# Patient Record
Sex: FEMALE | Race: WHITE
[De-identification: names, ages, dates, MRNs, and addresses within clinical notes are randomized per-mention and may not be internally consistent; named-entity substitution may affect disease eponyms.]

---

## 2017-02-10 ENCOUNTER — HOSPITAL ENCOUNTER (OUTPATIENT)
Dept: HOSPITAL 61 - PCVCCLINIC | Age: 72
Discharge: HOME | End: 2017-02-10
Attending: INTERNAL MEDICINE
Payer: MEDICARE

## 2017-02-10 DIAGNOSIS — R94.31: Primary | ICD-10-CM

## 2017-02-10 DIAGNOSIS — E78.5: ICD-10-CM

## 2017-02-10 DIAGNOSIS — I10: ICD-10-CM

## 2017-02-10 DIAGNOSIS — J44.9: ICD-10-CM

## 2017-02-10 DIAGNOSIS — F17.200: ICD-10-CM

## 2017-02-10 DIAGNOSIS — Z82.49: ICD-10-CM

## 2017-02-10 PROCEDURE — G0463 HOSPITAL OUTPT CLINIC VISIT: HCPCS

## 2017-02-10 PROCEDURE — 80061 LIPID PANEL: CPT

## 2017-02-10 PROCEDURE — 93005 ELECTROCARDIOGRAM TRACING: CPT

## 2017-08-24 ENCOUNTER — HOSPITAL ENCOUNTER (OUTPATIENT)
Dept: HOSPITAL 61 - PCVCCLINIC | Age: 72
Discharge: HOME | End: 2017-08-24
Attending: INTERNAL MEDICINE
Payer: MEDICARE

## 2017-08-24 DIAGNOSIS — J43.2: ICD-10-CM

## 2017-08-24 DIAGNOSIS — E78.5: ICD-10-CM

## 2017-08-24 DIAGNOSIS — Z87.891: ICD-10-CM

## 2017-08-24 DIAGNOSIS — E03.9: ICD-10-CM

## 2017-08-24 DIAGNOSIS — Z82.49: ICD-10-CM

## 2017-08-24 DIAGNOSIS — Z88.8: ICD-10-CM

## 2017-08-24 DIAGNOSIS — R94.31: ICD-10-CM

## 2017-08-24 DIAGNOSIS — I10: Primary | ICD-10-CM

## 2017-08-24 PROCEDURE — 80061 LIPID PANEL: CPT

## 2017-08-24 PROCEDURE — G0463 HOSPITAL OUTPT CLINIC VISIT: HCPCS

## 2017-09-05 ENCOUNTER — HOSPITAL ENCOUNTER (OUTPATIENT)
Dept: HOSPITAL 61 - PCVCIMAG | Age: 72
Discharge: HOME | End: 2017-09-05
Attending: INTERNAL MEDICINE
Payer: MEDICARE

## 2017-09-05 DIAGNOSIS — J43.9: ICD-10-CM

## 2017-09-05 DIAGNOSIS — Z87.891: ICD-10-CM

## 2017-09-05 DIAGNOSIS — R94.31: ICD-10-CM

## 2017-09-05 DIAGNOSIS — I10: ICD-10-CM

## 2017-09-05 DIAGNOSIS — I08.3: Primary | ICD-10-CM

## 2017-09-05 DIAGNOSIS — K52.9: ICD-10-CM

## 2017-09-05 DIAGNOSIS — E78.5: ICD-10-CM

## 2017-09-05 PROCEDURE — A9500 TC99M SESTAMIBI: HCPCS

## 2017-09-05 PROCEDURE — 93306 TTE W/DOPPLER COMPLETE: CPT

## 2017-09-05 PROCEDURE — 78452 HT MUSCLE IMAGE SPECT MULT: CPT

## 2017-09-05 PROCEDURE — 93017 CV STRESS TEST TRACING ONLY: CPT

## 2017-09-05 NOTE — PCVCIMAG
--------------- APPROVED REPORT --------------





Exam: Nuclear Stress Test

Indication: Chest Pain, Abn EKG

Patient Location: Out Patient

Stress Nurse: Sayda Higgins RN, Johnna Gar RN

NM Tech:Willy Zimmer NMTCB



Ht: 5 ft 2 in Wt: 171 lbs BSA:  1.79 m2

HR: 59 bpm                      BP: 178/74 mmHg         BMI:  

31.2

Rhythm:  NSR



Medical History

Medical History: Age, HTN, Former Smoker, Abn EKG

Medications: Atenolol (held 24 hours) Buspar, Fish Oil

Allergies: Diltiazem, Nortriptylline, Butalbitol

Pretest Chest Pain Characteristics: No chest pain

Exercise History: Sedentary



NM EXAM: Myocardial Perfusion REST/STRESS

Imaging Protocol: Rest Tc-99m/Stress Tc-99m 1 day



Resting Data

Rest SPECT myocardial perfusion imaging was performed in supine 

position 45 minutes following the intravenous injection of 11.6 mCi 

of Tc-99m Sestamibi.

Time of rest injection: 0915     Date: 09/05/2017



Pharmacologic Stress

Pharmacologic stress test was performed by injecting Regadenoson 0.4 

mg IV push followed by the intravenous injection of 35.5 mCi of 

Tc-99m Sestamibi.

Time of stress injection: 1040     Date: 09/05/2017

The images were gated to evaluate regional wall motion and calculate 

left ventricular ejection fraction. 



Study Quality

Study: Good



Study Data

Post stress, the left ventricular ejection was 75%..

SSS: 2

SRS: 1

SDS: 1

TID = 0.96.



Perfusion

No evidence of stress induced ischemia or prior myocardial 

infarction.



Wall Motion

Normal left ventricular size and function with no regional wall 

motion abnormalities.



Nuclear Conclusion

No evidence of stress induced ischemia or prior myocardial 

infarction.

Normal left ventricular size and function with no regional wall 

motion abnormalities.

Post stress, the left ventricular ejection was 75%.. 

No prior study available for comparison.



Interpreted by:  Reji Catalan MD

Electronically Approved: 09/05/2017 12:08:35



Stress Test Details

Stress Test:  Pharmacologic stress was paired with low level 

exercise.

HR

Resting HR:            59 bpmMax Heart Rate (APMHR): 149 bpm 

Max HR Achieved:  94 bpmTarget HR (85% APMHR): 126 bpm

% of APMHR:         63

Recovery HR:            68 bpm



BP

Resting BP:  178/74 mmHg

Max BP:       152/74 mmHg



ECG

Resting ECG:  Sinus Rhythm  with nonspecific ST and T wave 

changes

Stress ECG:     Sinus Rhythm

Maximum ST Deviation: 0 mm

Arrhythmia:    VPC's

Recovery ECG: Sinus Rhythm

Recovery ST Change: , Normal

Recovery ST Deviation: 0 mm

Recovery Arrhythmia: VPC



Clinical

Reason for Termination: Completed protocol

Stress Symptoms: Dyspnea

Exercise duration: 4 min 0 sec

Exercise capacity: 1.6 METs

Symptoms resolved during recovery.



Stress ECG Conclusion

ECG: Non-ischemic

Clinical: Non-ischemic



&lt;Conclusion&gt;

ECG: Non-ischemic

Clinical: Non-ischemic

## 2017-09-05 NOTE — PCVCIMAG
--------------- APPROVED REPORT --------------





Study performed:  2017 07:59:59



EXAM: Comprehensive 2D, Doppler, and color-flow 

Echocardiogram

Patient Location: Echo lab

Status:  routine



BSA:         1.81

HR: 54 bpmBP:          122/72 mmHg

Rhythm: Bradycardia



Other Information 

Study Quality: Good



Risk Factors: 

Cardiac Risk Factors:  HTN



Indications

Abnormal ECG 

Chest Pain



2D Dimensions

LVEF(%):  56.03 (&gt;50%)

IVSd:  11.35 (7-11mm)

LVDd:  41.72 mm

PWd:  11.30 (7-11mm)

LVDs:  29.65 (25-40mm)

Left Atrium:  41.70 (27-40mm)

Aortic Root:  32.14 mm

LV Single Plane 4CH:  68.05 %

LV Single Plane 2CH:  69.15 %Infante's LVEF:  68.60 %

Biplane EF:  69.8 %



Volumes

Left Atrial Volume (Systole)

Single Plane 4CH:  82.57 mLSingle Plane 2CH:  70.23 mL

LA ESV Index:  42.00 mL/m2



Aortic Valve

AoV Peak Mayank.:  1.46 m/s

AO Peak Gr.:  8.53 mmHgLVOT Max P.82 mmHg

LVOT Max V:  1.21 m/s

AI Vmax:  5.40 m/s

AI Randolph:  2.36 m/s2

AI PHT:  662.50 ms



Mitral Valve

E/A Ratio:  1.4

MV Decel. Time:  223.01 ms

MV E Max Mayank.:  0.63 m/s

MV A Mayank.:  0.46 m/s

IVRT:  152.25 ms



Pulmonary Valve

PV Peak Mayank.:  1.04 m/sPV Peak Gr.:  4.29 mmHg



Pulmonary Vein

P Vein S:    0.35 m/sP Vein A:  0.28 m/s

P Vein D:   0.58 m/sP Vein A Dur.:  145.3 msec

P Vein S/D Ratio:  0.60



Tricuspid Valve

TR Peak Mayank.:  2.38 m/s

TR Peak Gr.:  22.57 mmHg



Left Ventricle

The left ventricle is normal size. There is normal LV segmental wall 

motion. There is normal left ventricular wall thickness. Left 

ventricular systolic function is normal. The left ventricular 

ejection fraction is within the normal range. LVEF is 65%. Grade II - 

pseudonormal filling dynamics.



Right Ventricle

The right ventricle is normal size. The right ventricular systolic 

function is normal.



Atria

Left atrium is mildly dilated. The right atrium size is 

normal.



Aortic Valve

The aortic valve is trileaflet, mildly sclerotic Mild aortic 

regurgitation. There is no aortic valvular stenosis.



Mitral Valve

The mitral valve is normal in structure. Mild mitral regurgitation. 

No evidence of mitral valve stenosis.



Tricuspid Valve

The tricuspid valve is normal in structure. Trace tricuspid 

regurgitation with PAP of 30 mmHg.



Pulmonic Valve

The pulmonary valve is normal in structure. Trace pulmonic 

regurgitation.



Great Vessels

The aortic root is normal in size. IVC is normal in size and 

collapses with &gt;50% inspiration



Pericardium

There is no pericardial effusion.



&lt;Conclusion&gt;

Left ventricular systolic function is normal.

There is normal LV segmental wall motion.

LVEF is 65%. Grade II - pseudonormal filling dynamics.

Left atrium is mildly dilated.

The aortic valve is trileaflet, mildly sclerotic.  Mild aortic 

regurgitation, no stenosis.

The mitral valve is normal in structure. Mild mitral 

regurgitation.

There is no pericardial effusion.

## 2018-02-26 ENCOUNTER — HOSPITAL ENCOUNTER (OUTPATIENT)
Dept: HOSPITAL 61 - PCVCCLINIC | Age: 73
Discharge: HOME | End: 2018-02-26
Attending: INTERNAL MEDICINE
Payer: MEDICARE

## 2018-02-26 DIAGNOSIS — I10: Primary | ICD-10-CM

## 2018-02-26 DIAGNOSIS — Z87.891: ICD-10-CM

## 2018-02-26 DIAGNOSIS — E78.5: ICD-10-CM

## 2018-02-26 DIAGNOSIS — R94.31: ICD-10-CM

## 2018-02-26 DIAGNOSIS — Z79.899: ICD-10-CM

## 2018-02-26 DIAGNOSIS — J43.2: ICD-10-CM

## 2018-02-26 PROCEDURE — 93005 ELECTROCARDIOGRAM TRACING: CPT

## 2018-02-26 PROCEDURE — 80061 LIPID PANEL: CPT

## 2018-10-17 ENCOUNTER — HOSPITAL ENCOUNTER (OUTPATIENT)
Dept: HOSPITAL 61 - PCVCCLINIC | Age: 73
Discharge: HOME | End: 2018-10-17
Attending: INTERNAL MEDICINE
Payer: MEDICARE

## 2018-10-17 DIAGNOSIS — Z88.8: ICD-10-CM

## 2018-10-17 DIAGNOSIS — R94.31: ICD-10-CM

## 2018-10-17 DIAGNOSIS — Z79.899: ICD-10-CM

## 2018-10-17 DIAGNOSIS — Z79.82: ICD-10-CM

## 2018-10-17 DIAGNOSIS — E78.5: ICD-10-CM

## 2018-10-17 DIAGNOSIS — I11.0: Primary | ICD-10-CM

## 2018-10-17 DIAGNOSIS — R07.9: ICD-10-CM

## 2018-10-17 DIAGNOSIS — J43.2: ICD-10-CM

## 2018-10-17 DIAGNOSIS — Z87.891: ICD-10-CM

## 2018-10-17 DIAGNOSIS — I50.32: ICD-10-CM

## 2018-10-17 PROCEDURE — 80061 LIPID PANEL: CPT

## 2018-10-17 PROCEDURE — 93005 ELECTROCARDIOGRAM TRACING: CPT

## 2018-10-17 PROCEDURE — G0463 HOSPITAL OUTPT CLINIC VISIT: HCPCS

## 2019-04-18 ENCOUNTER — HOSPITAL ENCOUNTER (OUTPATIENT)
Dept: HOSPITAL 61 - PCVCCLINIC | Age: 74
Discharge: HOME | End: 2019-04-18
Attending: INTERNAL MEDICINE
Payer: MEDICARE

## 2019-04-18 DIAGNOSIS — R94.31: Primary | ICD-10-CM

## 2019-04-18 DIAGNOSIS — I11.0: ICD-10-CM

## 2019-04-18 DIAGNOSIS — Z87.891: ICD-10-CM

## 2019-04-18 DIAGNOSIS — Z88.8: ICD-10-CM

## 2019-04-18 DIAGNOSIS — I50.32: ICD-10-CM

## 2019-04-18 DIAGNOSIS — J43.2: ICD-10-CM

## 2019-04-18 DIAGNOSIS — E78.5: ICD-10-CM

## 2019-04-18 DIAGNOSIS — Z79.82: ICD-10-CM

## 2019-04-18 PROCEDURE — 80061 LIPID PANEL: CPT

## 2019-04-18 PROCEDURE — 36415 COLL VENOUS BLD VENIPUNCTURE: CPT

## 2019-04-18 PROCEDURE — G0463 HOSPITAL OUTPT CLINIC VISIT: HCPCS

## 2019-04-18 PROCEDURE — 93005 ELECTROCARDIOGRAM TRACING: CPT

## 2019-10-24 ENCOUNTER — HOSPITAL ENCOUNTER (OUTPATIENT)
Dept: HOSPITAL 61 - PCVCIMAG | Age: 74
Discharge: HOME | End: 2019-10-24
Attending: INTERNAL MEDICINE
Payer: MEDICARE

## 2019-10-24 DIAGNOSIS — R94.31: ICD-10-CM

## 2019-10-24 DIAGNOSIS — J43.2: Primary | ICD-10-CM

## 2019-10-24 DIAGNOSIS — E78.00: ICD-10-CM

## 2019-10-24 DIAGNOSIS — I11.0: ICD-10-CM

## 2019-10-24 DIAGNOSIS — Z87.891: ICD-10-CM

## 2019-10-24 DIAGNOSIS — Z79.82: ICD-10-CM

## 2019-10-24 DIAGNOSIS — I50.32: ICD-10-CM

## 2019-10-24 DIAGNOSIS — Z79.899: ICD-10-CM

## 2019-10-24 DIAGNOSIS — E78.5: ICD-10-CM

## 2019-10-24 DIAGNOSIS — I35.8: ICD-10-CM

## 2019-10-24 DIAGNOSIS — E03.9: ICD-10-CM

## 2019-10-24 PROCEDURE — 93306 TTE W/DOPPLER COMPLETE: CPT

## 2019-10-24 PROCEDURE — 36415 COLL VENOUS BLD VENIPUNCTURE: CPT

## 2019-10-24 PROCEDURE — G0463 HOSPITAL OUTPT CLINIC VISIT: HCPCS

## 2019-10-24 PROCEDURE — 93005 ELECTROCARDIOGRAM TRACING: CPT

## 2019-10-24 PROCEDURE — 80061 LIPID PANEL: CPT

## 2019-10-24 NOTE — PCVCIMAG
--------------- APPROVED REPORT --------------





Study performed:  10/24/2019 14:00:50



EXAM: Comprehensive 2D, Doppler, and color-flow 

Echocardiogram

Patient Location: Echo lab

Status:  routine



BSA:         1.78

HR: 52 bpmBP:          130/80 mmHg

Rhythm: NSR



Other Information 

Study Quality: Good



Indications

Abnormal ECG 

Abnormal EKG, Emphysema



2D Dimensions

IVSd:  13.28 (7-11mm)LVOT Diam:  17.56 (18-24mm) 

LVDd:  35.64 mm

PWd:  11.83 (7-11mm)Ascending Ao:  34.09 (22-36mm)

LVDs:  24.75 (25-40mm)

Left Atrium:  47.01 (27-40mm)

LV Single Plane 4CH:  56.63 %

LV Single Plane 2CH:  65.04 %

Biplane EF:  61.2 %



Volumes

Left Atrial Volume (Systole)

Single Plane 4CH:  43.28 mLSingle Plane 2CH:  38.94 mL

LA ESV Index:  24.00 mL/m2



Aortic Valve

AoV Peak Mayank.:  1.47 m/s

AO Peak Gr.:  8.63 mmHgLVOT Max P.46 mmHg

LVOT Max V:  1.17 m/s

FRANK Vmax: 1.93 cm2



Mitral Valve

E/A Ratio:  0.7

MV Decel. Time:  257.77 ms

MV E Max Mayank.:  0.51 m/s

MV A Mayank.:  0.77 m/s



TDI

E/Lateral E':  7.29E/Medial E':  8.50

Medial E' Mayank.:  0.06 m/s

Lateral E' Mayank.:  0.07 m/s



Pulmonary Valve

PV Peak Gr.:  4.41 mmHg



Pulmonary Vein

P Vein S:    0.38 m/sP Vein A:  0.50 m/s

P Vein D:   0.31 m/sP Vein A Dur.:  128.0 msec

P Vein S/D Ratio:  1.23



Left Ventricle

The left ventricle is normal size. There is normal LV segmental wall 

motion. There is normal left ventricular wall thickness. Left 

ventricular systolic function is normal. The left ventricular 

ejection fraction is within the normal range. LVEF is 60-65%. Mild 

diastolic dysfunction is present (impaired relaxation 

pattern).



Right Ventricle

The right ventricle is normal size. The right ventricular systolic 

function is normal.



Atria

The left atrium size is normal. The right atrium size is 

normal.



Aortic Valve

The aortic valve is minimally sclerotic Trace aortic regurgitation. 

There is no aortic valvular stenosis.



Mitral Valve

The mitral valve is normal in structure. Trace mitral regurgitation. 

No evidence of mitral valve stenosis.



Tricuspid Valve

The tricuspid valve is normal in structure. There is no tricuspid 

valve regurgitation noted.



Pulmonic Valve

The pulmonary valve is normal in structure. There is no pulmonic 

valvular regurgitation.



Great Vessels

The aortic root is normal in size. IVC is normal in size and 

collapses >50% with inspiration.



Pericardium

There is no pericardial effusion.



<Conclusion>

Left ventricular systolic function is normal.

There is normal LV segmental wall motion.

LVEF is 60-65%. Mild diastolic dysfunction

The aortic valve is minimally sclerotic. Trace aortic 

regurgitation.

The mitral valve is normal in structure. Trace mitral 

regurgitation.

Pulmonary artery systolic pressure could not be reliably 

ascertained

There is no pericardial effusion.

## 2020-05-14 ENCOUNTER — HOSPITAL ENCOUNTER (OUTPATIENT)
Dept: HOSPITAL 35 - SJCVC | Age: 75
End: 2020-05-14
Attending: INTERNAL MEDICINE
Payer: COMMERCIAL

## 2020-05-14 DIAGNOSIS — Z79.899: ICD-10-CM

## 2020-05-14 DIAGNOSIS — Z79.82: ICD-10-CM

## 2020-05-14 DIAGNOSIS — I25.2: ICD-10-CM

## 2020-05-14 DIAGNOSIS — Z82.49: ICD-10-CM

## 2020-05-14 DIAGNOSIS — E78.5: ICD-10-CM

## 2020-05-14 DIAGNOSIS — I11.0: ICD-10-CM

## 2020-05-14 DIAGNOSIS — E03.9: ICD-10-CM

## 2020-05-14 DIAGNOSIS — I44.0: Primary | ICD-10-CM

## 2020-05-14 DIAGNOSIS — I50.32: ICD-10-CM

## 2020-05-14 DIAGNOSIS — Z87.891: ICD-10-CM

## 2020-05-14 DIAGNOSIS — I45.2: ICD-10-CM

## 2020-05-14 DIAGNOSIS — E78.00: ICD-10-CM

## 2020-05-14 DIAGNOSIS — R94.31: ICD-10-CM

## 2020-05-14 DIAGNOSIS — J43.2: ICD-10-CM

## 2020-05-14 DIAGNOSIS — R00.1: ICD-10-CM

## 2020-10-19 ENCOUNTER — HOSPITAL ENCOUNTER (OUTPATIENT)
Dept: HOSPITAL 35 - SJCVCIMAG | Age: 75
End: 2020-10-19
Attending: INTERNAL MEDICINE
Payer: COMMERCIAL

## 2020-10-19 DIAGNOSIS — I08.0: Primary | ICD-10-CM

## 2020-10-19 DIAGNOSIS — E78.5: ICD-10-CM

## 2020-10-19 DIAGNOSIS — I11.0: ICD-10-CM

## 2020-10-19 DIAGNOSIS — I44.0: ICD-10-CM

## 2020-10-19 DIAGNOSIS — R94.31: ICD-10-CM

## 2020-10-19 DIAGNOSIS — I50.32: ICD-10-CM

## 2020-10-19 DIAGNOSIS — Z87.891: ICD-10-CM

## 2020-10-19 DIAGNOSIS — Z79.899: ICD-10-CM

## 2020-10-19 DIAGNOSIS — R00.1: ICD-10-CM

## 2020-10-19 DIAGNOSIS — J43.2: ICD-10-CM

## 2021-10-12 ENCOUNTER — HOSPITAL ENCOUNTER (OUTPATIENT)
Dept: HOSPITAL 35 - SJCVC | Age: 76
End: 2021-10-12
Attending: INTERNAL MEDICINE
Payer: COMMERCIAL

## 2021-10-12 DIAGNOSIS — Z82.49: ICD-10-CM

## 2021-10-12 DIAGNOSIS — J43.2: ICD-10-CM

## 2021-10-12 DIAGNOSIS — I44.0: ICD-10-CM

## 2021-10-12 DIAGNOSIS — Z88.8: ICD-10-CM

## 2021-10-12 DIAGNOSIS — I11.0: ICD-10-CM

## 2021-10-12 DIAGNOSIS — Z79.82: ICD-10-CM

## 2021-10-12 DIAGNOSIS — Z87.891: ICD-10-CM

## 2021-10-12 DIAGNOSIS — I50.32: ICD-10-CM

## 2021-10-12 DIAGNOSIS — E78.00: ICD-10-CM

## 2021-10-12 DIAGNOSIS — E78.5: ICD-10-CM

## 2021-10-12 DIAGNOSIS — E03.9: ICD-10-CM

## 2021-10-12 DIAGNOSIS — Z79.899: ICD-10-CM

## 2021-10-12 DIAGNOSIS — J45.909: ICD-10-CM

## 2021-10-12 DIAGNOSIS — R94.31: Primary | ICD-10-CM

## 2021-10-12 DIAGNOSIS — R00.0: ICD-10-CM

## 2021-11-08 ENCOUNTER — HOSPITAL ENCOUNTER (OUTPATIENT)
Dept: HOSPITAL 35 - SJCVCIMAG | Age: 76
End: 2021-11-08
Attending: INTERNAL MEDICINE
Payer: COMMERCIAL

## 2021-11-08 DIAGNOSIS — E78.5: ICD-10-CM

## 2021-11-08 DIAGNOSIS — Z79.82: ICD-10-CM

## 2021-11-08 DIAGNOSIS — I50.32: ICD-10-CM

## 2021-11-08 DIAGNOSIS — J43.2: ICD-10-CM

## 2021-11-08 DIAGNOSIS — Z87.891: ICD-10-CM

## 2021-11-08 DIAGNOSIS — I11.0: ICD-10-CM

## 2021-11-08 DIAGNOSIS — Z82.49: ICD-10-CM

## 2021-11-08 DIAGNOSIS — Z88.8: ICD-10-CM

## 2021-11-08 DIAGNOSIS — I44.0: Primary | ICD-10-CM

## 2021-11-08 DIAGNOSIS — R94.31: ICD-10-CM

## 2021-11-08 DIAGNOSIS — E78.00: ICD-10-CM

## 2021-11-08 DIAGNOSIS — Z79.899: ICD-10-CM

## 2021-11-08 DIAGNOSIS — E03.9: ICD-10-CM

## 2021-11-08 DIAGNOSIS — R00.1: ICD-10-CM
